# Patient Record
Sex: FEMALE | Race: WHITE
[De-identification: names, ages, dates, MRNs, and addresses within clinical notes are randomized per-mention and may not be internally consistent; named-entity substitution may affect disease eponyms.]

---

## 2018-06-04 ENCOUNTER — HOSPITAL ENCOUNTER (OUTPATIENT)
Age: 73
End: 2018-06-04
Payer: MEDICARE

## 2018-06-04 DIAGNOSIS — Z13.818: Primary | ICD-10-CM

## 2018-06-04 LAB — HCV AB SERPL QL IA: NEGATIVE S/C

## 2018-06-04 PROCEDURE — 36415 COLL VENOUS BLD VENIPUNCTURE: CPT

## 2018-06-04 PROCEDURE — 86803 HEPATITIS C AB TEST: CPT

## 2018-08-31 ENCOUNTER — HOSPITAL ENCOUNTER (OUTPATIENT)
Age: 73
End: 2018-08-31
Payer: MEDICARE

## 2018-08-31 DIAGNOSIS — I10: Primary | ICD-10-CM

## 2018-08-31 LAB
BUN SERPL-MCNC: 9 MG/DL (ref 7–17)
CALCIUM SERPL-MCNC: 9.2 MG/DL (ref 8.4–10.2)
CHLORIDE SERPL-SCNC: 96 MMOL/L (ref 98–107)
CO2 SERPL-SCNC: 31 MMOL/L (ref 22–32)
ESTIMATED GLOMERULAR FILT RATE: > 60 ML/MIN (ref 60–?)
GLUCOSE SERPL-MCNC: 94 MG/DL (ref 80–110)
HEMOLYSIS: < 15 (ref 0–50)
POTASSIUM SERPL-SCNC: 4.9 MMOL/L (ref 3.4–5.1)
SODIUM SERPL-SCNC: 133 MMOL/L (ref 137–145)

## 2018-08-31 PROCEDURE — 80048 BASIC METABOLIC PNL TOTAL CA: CPT

## 2018-08-31 PROCEDURE — 36415 COLL VENOUS BLD VENIPUNCTURE: CPT

## 2018-12-12 ENCOUNTER — HOSPITAL ENCOUNTER (OUTPATIENT)
Age: 73
End: 2018-12-12
Payer: MEDICARE

## 2018-12-12 DIAGNOSIS — Z80.3: ICD-10-CM

## 2018-12-12 DIAGNOSIS — Z12.31: Primary | ICD-10-CM

## 2018-12-12 PROCEDURE — 77063 BREAST TOMOSYNTHESIS BI: CPT

## 2018-12-12 PROCEDURE — 77067 SCR MAMMO BI INCL CAD: CPT

## 2018-12-12 NOTE — DI.MG.S_ITS
BILATERAL DIGITAL SCREENING MAMMOGRAM 3D/2D WITH CAD: 12/12/2018  
   
CLINICAL: Routine screening. Family history of breast cancer.    
   
Comparison is made to exams dated:  10/11/2017 mammogram, 6/8/2016 mammogram, 6/3/2015   
mammogram, 3/12/2010 mammogram, 5/30/2014 mammogram, and 5/1/2013 mammogram - [facility].  There are scattered fibroglandular elements in both breasts.    
   
Current study was also evaluated with a Computer Aided Detection (CAD) system.    
No significant masses, calcifications, or other findings are seen in either breast.    
There has been no significant interval change.  
   
IMPRESSION: NEGATIVE  
There is no mammographic evidence of malignancy. A 1 year screening mammogram is   
recommended.     
   
This exam was interpreted at Station ID: [id number].    
   
NOTE: For mammograms, a report in lay terms will be sent to the patient. Approximately   
15% of breast malignancies will not be visualized mammographically. In the management of   
a palpable breast mass, a negative mammogram must not discourage biopsy of a clinically   
suspicious lesion.  
   
Electronically Signed By: Flakito clemens/nilson:12/12/2018 14:12:06    
   
   
letter sent: Normal Exam    
ACR BI-RADS Category 1: Negative [id number]

## 2019-02-18 ENCOUNTER — HOSPITAL ENCOUNTER (OUTPATIENT)
Age: 74
End: 2019-02-18
Payer: MEDICARE

## 2019-02-18 DIAGNOSIS — Z90.722: ICD-10-CM

## 2019-02-18 DIAGNOSIS — Z78.0: ICD-10-CM

## 2019-02-18 DIAGNOSIS — M85.852: Primary | ICD-10-CM

## 2019-02-18 DIAGNOSIS — Z82.62: ICD-10-CM

## 2019-02-18 PROCEDURE — 77080 DXA BONE DENSITY AXIAL: CPT

## 2019-02-18 NOTE — DI.RAD.S_ITS
This blank DEXA report has been sent in error by the PACS system.  The correct and   
complete report will be forthcoming in 1-2 days.  Thank you for your patience and   
understanding.     
   
   
   
Dictated by: Gauri Armando M.D. on 2/18/2019 at 16:35       
Approved by: Gauri Armando M.D. on 2/18/2019 at 16:35

## 2019-06-19 ENCOUNTER — HOSPITAL ENCOUNTER (OUTPATIENT)
Age: 74
End: 2019-06-19
Payer: MEDICARE

## 2019-06-19 DIAGNOSIS — I10: Primary | ICD-10-CM

## 2019-06-19 LAB
CHOLEST SERPL-MCNC: 213 MG/DL (ref 140–199)
HDLC SERPL-MCNC: > 110 MG/DL (ref 40–60)
TRIGL SERPL-MCNC: 51 MG/DL (ref 35–150)

## 2019-06-19 PROCEDURE — 80061 LIPID PANEL: CPT

## 2019-06-19 PROCEDURE — 36415 COLL VENOUS BLD VENIPUNCTURE: CPT

## 2020-06-10 ENCOUNTER — HOSPITAL ENCOUNTER (OUTPATIENT)
Age: 75
End: 2020-06-10
Payer: MEDICARE

## 2020-06-10 DIAGNOSIS — E78.2: ICD-10-CM

## 2020-06-10 DIAGNOSIS — I10: Primary | ICD-10-CM

## 2020-06-10 LAB
BUN SERPL-MCNC: 20 MG/DL (ref 7–17)
CALCIUM SERPL-MCNC: 9.6 MG/DL (ref 8.4–10.2)
CHLORIDE SERPL-SCNC: 103 MMOL/L (ref 98–107)
CHOLEST SERPL-MCNC: 245 MG/DL (ref 140–199)
CO2 SERPL-SCNC: 24 MMOL/L (ref 22–32)
ESTIMATED GLOMERULAR FILT RATE: > 60 ML/MIN (ref 60–?)
GLUCOSE SERPL-MCNC: 108 MG/DL (ref 80–110)
HDLC SERPL-MCNC: 132 MG/DL (ref 40–60)
HEMOLYSIS: < 15 (ref 0–50)
POTASSIUM SERPL-SCNC: 4.6 MMOL/L (ref 3.4–5.1)
SODIUM SERPL-SCNC: 134 MMOL/L (ref 137–145)
TRIGL SERPL-MCNC: 46 MG/DL (ref 35–150)

## 2020-06-10 PROCEDURE — 80048 BASIC METABOLIC PNL TOTAL CA: CPT

## 2020-06-10 PROCEDURE — 80061 LIPID PANEL: CPT

## 2020-06-10 PROCEDURE — 36415 COLL VENOUS BLD VENIPUNCTURE: CPT

## 2020-09-24 ENCOUNTER — HOSPITAL ENCOUNTER (OUTPATIENT)
Age: 75
End: 2020-09-24
Payer: MEDICARE

## 2020-09-24 DIAGNOSIS — S22.029A: Primary | ICD-10-CM

## 2020-09-24 DIAGNOSIS — R07.81: ICD-10-CM

## 2020-09-24 DIAGNOSIS — M54.9: ICD-10-CM

## 2020-09-24 DIAGNOSIS — M79.601: ICD-10-CM
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
## 2020-09-24 PROCEDURE — 71101 X-RAY EXAM UNILAT RIBS/CHEST: CPT

## 2020-09-24 PROCEDURE — 72100 X-RAY EXAM L-S SPINE 2/3 VWS: CPT

## 2020-09-24 PROCEDURE — 72072 X-RAY EXAM THORAC SPINE 3VWS: CPT

## 2020-09-24 PROCEDURE — 73110 X-RAY EXAM OF WRIST: CPT

## 2020-09-24 NOTE — DI.RAD.S_ITS
PROCEDURE:  XR LUMBAR SPINE 2-3V  
   
INDICATIONS:  Fall  
   
TECHNIQUE:  Three views of the lumbar spine were acquired.    
   
COMPARISON:  None.  
   
FINDINGS:    
   
Bones:  Five non-rib-bearing vertebrae are present.  There is an L2 compression fracture   
without retropulsion of fracture fragments.  There is anterior endplate spur formation at   
this and other levels.  Trace anterolisthesis L4 on five and moderate disc height loss   
L5-S1.  No suspicious bony lesions.    
   
Soft tissues:  Overlying bowel gas pattern is normal.  No suspicious soft tissue   
calcifications.    
   
IMPRESSION:    
1. Findings of possible acute L2 compression fracture.  No prior studies are available   
for comparison.  Correlate clinically and consider MRI to assess for acuity.  
2. Moderate degenerative disc and endplate changes.    
   
   
Dictated by: Gauri Armando M.D. on 9/24/2020 at 18:09       
Approved by: Gauri Armando M.D. on 9/24/2020 at 18:10

## 2020-09-24 NOTE — DI.RAD.S_ITS
PROCEDURE:  XR RIBS LT MIN 3V W CXR1V  
   
INDICATIONS:  Fall  
   
TECHNIQUE:  Two views of the left ribs were acquired, along with a single view chest.    
   
COMPARISON:  None.  
   
FINDINGS:    
   
Surgical changes and devices:  None.    
   
Bones and chest wall:  No fractures or dislocations.  No suspicious bony lesions.    
Overlying soft tissues appear unremarkable.    
   
Lungs and pleura:  No pleural effusions or pneumothorax.  Lungs appear clear.    
   
Mediastinum:  Mediastinal contours appear normal.  Heart size is normal.    
   
IMPRESSION:    
1. No displaced rib fractures.  
2. No radiographic evidence of underlying chest trauma.    
   
   
Dictated by: Gauri Armando M.D. on 9/24/2020 at 18:08       
Approved by: Gauri Armando M.D. on 9/24/2020 at 18:09

## 2020-09-24 NOTE — DI.RAD.S_ITS
8PROCEDURE:  XR WRIST RT MIN 3V  
   
INDICATIONS: Fall  
   
TECHNIQUE:  Four views of the wrist were acquired.    
   
COMPARISON:  None.  
   
FINDINGS:    
   
Bones:  No fractures or dislocations.  No suspicious bony lesions.  Severe degenerative   
changes, particularly at the 2nd and 3rd MCP joints.  
   
Scaphoid view:  No scaphoid fracture.  
   
Soft tissues:  No suspicious soft tissue calcifications.    
   
IMPRESSION:    
1. No visible fracture.  If there is continued concern for occult fracture,   
immobilization and reimaging in 7-10 days is recommended.  
2. Degenerative changes at the 2nd and 3rd MCP joints particularly.    
   
   
Dictated by: Gauri Armnado M.D. on 9/24/2020 at 18:10       
Approved by: Gauri Armando M.D. on 9/24/2020 at 18:12

## 2020-10-23 ENCOUNTER — HOSPITAL ENCOUNTER (OUTPATIENT)
Age: 75
End: 2020-10-23
Payer: MEDICARE

## 2020-10-23 DIAGNOSIS — M25.531: Primary | ICD-10-CM

## 2020-10-23 DIAGNOSIS — S52.571A: ICD-10-CM

## 2020-10-23 PROCEDURE — 73200 CT UPPER EXTREMITY W/O DYE: CPT

## 2020-10-23 NOTE — DI.CT.S_ITS
PROCEDURE:  CT UE RT WO CON  
   
INDICATIONS:  Pain in right wrist  
   
TECHNIQUE:    
Noncontrast 1 mm axial sections acquired through the carpal bones, with coronal and   
sagittal reformats.   
   
   
COMPARISON:  [facility], CR, XR WRIST RT MIN 3V, 9/24/2020, 16:33.  
   
FINDINGS:    
Image quality:  Excellent.    
   
Bones:  There is a subtle slightly comminuted fracture involving radial aspect of distal   
radius extending to base of radial styloid and radiocarpal joint space.  Minimal anterior   
and lateral displacement is seen with up to 3.2 mm diastasis at the fracture site.  No   
other fracture or dislocation is seen.  Osteoarthritic changes are noted throughout wrist   
joints.  No suspicious intraosseous lesion.  
   
Soft tissues:  There is mild soft tissue swelling over radial aspect of right wrist.    
Overlying thickened extensor pollicis brevis and abductor pollicis longus tendon at the   
level of radial styloid is lead noted suggestive of tendinosis/low-grade   
partial-thickness tear.  No gross full-thickness wrist tendon rupture.  No abnormal soft   
tissue calcification is seen.  
   
IMPRESSION:    
1. Subacute appearing comminuted and minimally displaced distal radial intra-articular   
fracture extending to base of radial styloid as described above.  No other fracture or   
dislocation.  Wrist joint osteoarthritis.  
2. Soft tissue swelling around right distal radial fracture site with suggestion of   
tendinosis/low-grade partial-thickness tear involving adjacent extensor pollicis brevis   
and abductor pollicis longus tendons.  
   
   
Dictated by: Aaron Inman M.D. on 10/23/2020 at 13:20       
Approved by: Aaron Inman M.D. on 10/23/2020 at 13:28

## 2021-01-21 ENCOUNTER — HOSPITAL ENCOUNTER (OUTPATIENT)
Age: 76
End: 2021-01-21
Payer: MEDICARE

## 2021-01-21 DIAGNOSIS — Z12.31: Primary | ICD-10-CM

## 2021-01-21 PROCEDURE — 77063 BREAST TOMOSYNTHESIS BI: CPT

## 2021-01-21 PROCEDURE — 77067 SCR MAMMO BI INCL CAD: CPT

## 2021-01-21 NOTE — DI.MG.S_ITS
BILATERAL DIGITAL SCREENING MAMMOGRAM 3D/2D WITH CAD: 1/21/2021  
   
CLINICAL: Routine screening. Family history of breast cancer.    
   
Comparison is made to exams dated:  12/12/2018 mammogram, 10/11/2017 mammogram, and   
6/8/2016 mammogram - [facility].  There are scattered fibroglandular elements in   
both breasts.    
   
Current study was also evaluated with a Computer Aided Detection (CAD) system.    
No significant masses, calcifications, or other findings are seen in either breast.    
There has been no significant interval change.  
   
IMPRESSION: NEGATIVE  
There is no mammographic evidence of malignancy. A 1 year screening mammogram is   
recommended.    
   
   
This exam was interpreted at Station ID: [id number].    
   
NOTE: For mammograms, a report in lay terms will be sent to the patient. Approximately   
15% of breast malignancies will not be visualized mammographically. In the management of   
a palpable breast mass, a negative mammogram must not discourage biopsy of a clinically   
suspicious lesion.  
   
Electronically Signed By: Brarett syed/nilson:1/21/2021 11:36:52    
   
   
letter sent: Normal Exam    
ACR BI-RADS Category 1: Negative [id number]

## 2021-04-15 ENCOUNTER — HOSPITAL ENCOUNTER (OUTPATIENT)
Age: 76
End: 2021-04-15
Payer: MEDICARE

## 2021-04-15 DIAGNOSIS — R53.83: Primary | ICD-10-CM

## 2021-04-15 DIAGNOSIS — E78.2: ICD-10-CM

## 2021-04-15 DIAGNOSIS — I10: ICD-10-CM

## 2021-04-15 LAB
ADD MANUAL DIFF / SLIDE REVIEW: NO
ALBUMIN SERPL-MCNC: 4.3 G/DL (ref 3.5–5)
ALBUMIN/GLOB SERPL: 1.6 {RATIO} (ref 1–2.8)
ALP SERPL-CCNC: 68 U/L (ref 38–126)
ALT SERPL-CCNC: 27 IU/L (ref ?–35)
BUN SERPL-MCNC: 11 MG/DL (ref 7–17)
CALCIUM SERPL-MCNC: 9.6 MG/DL (ref 8.4–10.2)
CHLORIDE SERPL-SCNC: 102 MMOL/L (ref 98–107)
CHOLEST SERPL-MCNC: 240 MG/DL (ref 140–199)
CO2 SERPL-SCNC: 27 MMOL/L (ref 22–32)
ESTIMATED GLOMERULAR FILT RATE: > 60 ML/MIN (ref 60–?)
GLOBULIN SER CALC-MCNC: 2.7 G/DL (ref 1.7–4.1)
GLUCOSE SERPL-MCNC: 99 MG/DL (ref 80–110)
HDLC SERPL-MCNC: 134 MG/DL (ref 40–60)
HEMATOCRIT: 39.7 % (ref 36–46)
HEMOGLOBIN: 13.1 G/DL (ref 12–16)
HEMOLYSIS: < 15 (ref 0–50)
LYMPHOCYTES # SPEC AUTO: 2200 /UL (ref 1100–4500)
MCV RBC: 92.8 FL (ref 80–100)
MEAN CORPUSCULAR HEMOGLOBIN: 30.6 PG (ref 26–34)
MEAN CORPUSCULAR HGB CONC: 32.9 % (ref 30–36)
PLATELET COUNT: 322 X10^3/UL (ref 150–400)
POTASSIUM SERPL-SCNC: 4.2 MMOL/L (ref 3.4–5.1)
PROT SERPL-MCNC: 7 G/DL (ref 6.3–8.2)
SODIUM SERPL-SCNC: 135 MMOL/L (ref 137–145)
TRIGL SERPL-MCNC: 45 MG/DL (ref 35–150)
TSH W/ REFLEX TO FT4: 1.28 UIU/ML (ref 0.47–4.68)
VIT B12 SERPL-MCNC: > 1000 PG/ML (ref 239–931)

## 2021-04-15 PROCEDURE — 80053 COMPREHEN METABOLIC PANEL: CPT

## 2021-04-15 PROCEDURE — 85025 COMPLETE CBC W/AUTO DIFF WBC: CPT

## 2021-04-15 PROCEDURE — 82607 VITAMIN B-12: CPT

## 2021-04-15 PROCEDURE — 84443 ASSAY THYROID STIM HORMONE: CPT

## 2021-04-15 PROCEDURE — 36415 COLL VENOUS BLD VENIPUNCTURE: CPT

## 2021-04-15 PROCEDURE — 80061 LIPID PANEL: CPT

## 2021-06-28 ENCOUNTER — HOSPITAL ENCOUNTER (OUTPATIENT)
Age: 76
End: 2021-06-28
Payer: MEDICARE

## 2021-06-28 DIAGNOSIS — Z01.812: Primary | ICD-10-CM

## 2021-06-28 DIAGNOSIS — Z20.822: ICD-10-CM

## 2021-06-28 PROCEDURE — 87635 SARS-COV-2 COVID-19 AMP PRB: CPT

## 2021-06-29 ENCOUNTER — HOSPITAL ENCOUNTER (OUTPATIENT)
Age: 76
Discharge: HOME | End: 2021-06-29
Payer: MEDICARE

## 2021-06-29 VITALS
OXYGEN SATURATION: 94 % | RESPIRATION RATE: 16 BRPM | DIASTOLIC BLOOD PRESSURE: 74 MMHG | HEART RATE: 71 BPM | TEMPERATURE: 97.16 F | SYSTOLIC BLOOD PRESSURE: 146 MMHG

## 2021-06-29 VITALS
DIASTOLIC BLOOD PRESSURE: 75 MMHG | SYSTOLIC BLOOD PRESSURE: 139 MMHG | RESPIRATION RATE: 16 BRPM | HEART RATE: 68 BPM | OXYGEN SATURATION: 100 %

## 2021-06-29 VITALS — BODY MASS INDEX: 25.7 KG/M2

## 2021-06-29 DIAGNOSIS — H25.11: Primary | ICD-10-CM

## 2021-06-29 DIAGNOSIS — I10: ICD-10-CM

## 2021-06-29 PROCEDURE — V2787 ASTIGMATISM-CORRECT FUNCTION: HCPCS

## 2021-06-29 PROCEDURE — 66984 XCAPSL CTRC RMVL W/O ECP: CPT

## 2021-07-10 ENCOUNTER — HOSPITAL ENCOUNTER (OUTPATIENT)
Age: 76
End: 2021-07-10
Payer: MEDICARE

## 2021-07-10 DIAGNOSIS — Z01.812: Primary | ICD-10-CM

## 2021-07-10 DIAGNOSIS — Z20.822: ICD-10-CM

## 2021-07-10 PROCEDURE — 87635 SARS-COV-2 COVID-19 AMP PRB: CPT

## 2021-07-13 ENCOUNTER — HOSPITAL ENCOUNTER (OUTPATIENT)
Age: 76
Discharge: HOME | End: 2021-07-13
Payer: MEDICARE

## 2021-07-13 VITALS
TEMPERATURE: 97.52 F | DIASTOLIC BLOOD PRESSURE: 69 MMHG | RESPIRATION RATE: 22 BRPM | SYSTOLIC BLOOD PRESSURE: 166 MMHG | OXYGEN SATURATION: 98 % | HEART RATE: 65 BPM

## 2021-07-13 VITALS
RESPIRATION RATE: 18 BRPM | HEART RATE: 68 BPM | OXYGEN SATURATION: 100 % | SYSTOLIC BLOOD PRESSURE: 149 MMHG | TEMPERATURE: 97.88 F | DIASTOLIC BLOOD PRESSURE: 78 MMHG

## 2021-07-13 VITALS — BODY MASS INDEX: 24.9 KG/M2

## 2021-07-13 DIAGNOSIS — H25.12: Primary | ICD-10-CM

## 2021-07-13 DIAGNOSIS — I10: ICD-10-CM

## 2021-07-13 PROCEDURE — 66984 XCAPSL CTRC RMVL W/O ECP: CPT

## 2021-11-24 ENCOUNTER — HOSPITAL ENCOUNTER (EMERGENCY)
Age: 76
Discharge: HOME | End: 2021-11-24
Payer: MEDICARE

## 2021-11-24 VITALS
TEMPERATURE: 97.9 F | HEART RATE: 79 BPM | DIASTOLIC BLOOD PRESSURE: 79 MMHG | SYSTOLIC BLOOD PRESSURE: 168 MMHG | OXYGEN SATURATION: 100 % | RESPIRATION RATE: 16 BRPM

## 2021-11-24 VITALS — BODY MASS INDEX: 26.3 KG/M2

## 2021-11-24 DIAGNOSIS — N39.0: Primary | ICD-10-CM

## 2021-11-24 PROCEDURE — 99281 EMR DPT VST MAYX REQ PHY/QHP: CPT

## 2021-12-14 ENCOUNTER — HOSPITAL ENCOUNTER (OUTPATIENT)
Age: 76
End: 2021-12-14
Payer: MEDICARE

## 2021-12-14 DIAGNOSIS — N39.0: Primary | ICD-10-CM

## 2021-12-14 LAB
APPEARANCE UR: CLEAR
BILIRUBIN URINE UA: NEGATIVE
COLOR UR: YELLOW
CULTURE INDICATED URINE: (no result)
GLUCOSE URINE UA: NEGATIVE G/DL
HGB UR QL: (no result)
KETONES URINE UA: NEGATIVE
LEUKOCYTE ESTERASE URINE UA: (no result)
NITRITE URINE UA: NEGATIVE
PH UR: 7 [PH] (ref 4.5–8)
PROTEIN URINE UA: NEGATIVE
SP GR UR: 1.01 (ref 1–1.03)
UROBILINOGEN UR QL: 0.2 E.U./DL

## 2021-12-14 PROCEDURE — 87086 URINE CULTURE/COLONY COUNT: CPT

## 2021-12-14 PROCEDURE — 81001 URINALYSIS AUTO W/SCOPE: CPT

## 2022-02-09 ENCOUNTER — HOSPITAL ENCOUNTER (OUTPATIENT)
Age: 77
End: 2022-02-09
Payer: MEDICARE

## 2022-02-09 DIAGNOSIS — Z12.31: Primary | ICD-10-CM

## 2022-02-09 DIAGNOSIS — Z80.3: ICD-10-CM

## 2022-02-09 PROCEDURE — 77067 SCR MAMMO BI INCL CAD: CPT

## 2022-02-09 PROCEDURE — 77063 BREAST TOMOSYNTHESIS BI: CPT

## 2022-04-14 ENCOUNTER — HOSPITAL ENCOUNTER (OUTPATIENT)
Dept: HOSPITAL 73 - PHYS | Age: 77
LOS: 231 days | Discharge: HOME | End: 2022-12-01
Payer: MEDICARE

## 2022-04-14 DIAGNOSIS — M17.11: Primary | ICD-10-CM

## 2022-04-14 PROCEDURE — 97113 AQUATIC THERAPY/EXERCISES: CPT

## 2022-04-14 PROCEDURE — 97110 THERAPEUTIC EXERCISES: CPT

## 2022-04-14 PROCEDURE — 97116 GAIT TRAINING THERAPY: CPT

## 2022-04-14 PROCEDURE — 97535 SELF CARE MNGMENT TRAINING: CPT

## 2022-04-14 PROCEDURE — 97014 ELECTRIC STIMULATION THERAPY: CPT

## 2022-04-14 PROCEDURE — 97140 MANUAL THERAPY 1/> REGIONS: CPT

## 2022-04-14 PROCEDURE — 97112 NEUROMUSCULAR REEDUCATION: CPT

## 2022-04-14 PROCEDURE — 97032 APPL MODALITY 1+ESTIM EA 15: CPT

## 2022-04-14 PROCEDURE — 97162 PT EVAL MOD COMPLEX 30 MIN: CPT

## 2022-04-14 PROCEDURE — G0283 ELEC STIM OTHER THAN WOUND: HCPCS

## 2022-04-14 PROCEDURE — 97010 HOT OR COLD PACKS THERAPY: CPT

## 2022-08-12 ENCOUNTER — HOSPITAL ENCOUNTER (OUTPATIENT)
Age: 77
End: 2022-08-12
Payer: MEDICARE

## 2022-08-12 DIAGNOSIS — E78.5: ICD-10-CM

## 2022-08-12 DIAGNOSIS — R07.89: Primary | ICD-10-CM

## 2022-08-12 DIAGNOSIS — I10: ICD-10-CM

## 2022-08-12 DIAGNOSIS — Z20.822: ICD-10-CM

## 2022-08-12 PROCEDURE — A9502 TC99M TETROFOSMIN: HCPCS

## 2022-08-12 PROCEDURE — 93017 CV STRESS TEST TRACING ONLY: CPT

## 2022-08-12 PROCEDURE — 78452 HT MUSCLE IMAGE SPECT MULT: CPT

## 2022-08-12 PROCEDURE — 87635 SARS-COV-2 COVID-19 AMP PRB: CPT

## 2023-02-24 ENCOUNTER — HOSPITAL ENCOUNTER (OUTPATIENT)
Age: 78
End: 2023-02-24
Payer: MEDICARE

## 2023-02-24 DIAGNOSIS — Z12.31: Primary | ICD-10-CM

## 2023-02-24 DIAGNOSIS — Z80.3: ICD-10-CM

## 2023-02-24 PROCEDURE — 77063 BREAST TOMOSYNTHESIS BI: CPT

## 2023-02-24 PROCEDURE — 77067 SCR MAMMO BI INCL CAD: CPT

## 2023-04-27 ENCOUNTER — HOSPITAL ENCOUNTER (OUTPATIENT)
Age: 78
End: 2023-04-27
Payer: MEDICARE

## 2023-04-27 DIAGNOSIS — R52: Primary | ICD-10-CM

## 2023-04-27 LAB
COVID-19 CEPHEID 4-PLEX PCR: POSITIVE
FLUAV RNA UPPER RESP QL NAA+PROBE: (no result)
FLUBV RNA UPPER RESP QL NAA+PROBE: (no result)

## 2023-04-27 PROCEDURE — 0241U: CPT

## 2023-05-30 ENCOUNTER — HOSPITAL ENCOUNTER (OUTPATIENT)
Age: 78
End: 2023-05-30
Payer: MEDICARE

## 2023-05-30 DIAGNOSIS — Z87.891: ICD-10-CM

## 2023-05-30 DIAGNOSIS — R31.21: Primary | ICD-10-CM

## 2023-05-30 LAB
BUN SERPL-MCNC: 11 MG/DL (ref 7–17)
CALCIUM SERPL-MCNC: 9.2 MG/DL (ref 8.4–10.2)
CHLORIDE SERPL-SCNC: 100 MMOL/L (ref 98–107)
CO2 SERPL-SCNC: 26 MMOL/L (ref 22–32)
ESTIMATED GLOMERULAR FILT RATE: > 60 ML/MIN (ref 60–?)
GLUCOSE SERPL-MCNC: 83 MG/DL (ref 80–110)
HEMOLYSIS: < 15 (ref 0–50)
POTASSIUM SERPL-SCNC: 4.6 MMOL/L (ref 3.4–5.1)
SODIUM SERPL-SCNC: 133 MMOL/L (ref 137–145)

## 2023-05-30 PROCEDURE — 36415 COLL VENOUS BLD VENIPUNCTURE: CPT

## 2023-05-30 PROCEDURE — 80048 BASIC METABOLIC PNL TOTAL CA: CPT

## 2023-05-31 ENCOUNTER — HOSPITAL ENCOUNTER (OUTPATIENT)
Age: 78
End: 2023-05-31
Payer: MEDICARE

## 2023-05-31 DIAGNOSIS — R31.21: Primary | ICD-10-CM

## 2023-05-31 DIAGNOSIS — Z87.891: ICD-10-CM

## 2023-05-31 PROCEDURE — 74178 CT ABD&PLV WO CNTR FLWD CNTR: CPT

## 2023-08-14 ENCOUNTER — HOSPITAL ENCOUNTER (OUTPATIENT)
Age: 78
End: 2023-08-14
Payer: MEDICARE

## 2023-08-14 DIAGNOSIS — R93.429: Primary | ICD-10-CM

## 2023-08-14 DIAGNOSIS — M43.9: ICD-10-CM

## 2023-08-14 DIAGNOSIS — R31.21: ICD-10-CM

## 2023-08-14 PROCEDURE — 74176 CT ABD & PELVIS W/O CONTRAST: CPT

## 2023-09-11 ENCOUNTER — HOSPITAL ENCOUNTER (OUTPATIENT)
Age: 78
Discharge: HOME | End: 2023-09-11
Payer: MEDICARE

## 2023-09-11 VITALS
HEART RATE: 72 BPM | RESPIRATION RATE: 13 BRPM | SYSTOLIC BLOOD PRESSURE: 121 MMHG | OXYGEN SATURATION: 95 % | DIASTOLIC BLOOD PRESSURE: 66 MMHG

## 2023-09-11 VITALS
RESPIRATION RATE: 16 BRPM | DIASTOLIC BLOOD PRESSURE: 71 MMHG | TEMPERATURE: 96.98 F | OXYGEN SATURATION: 98 % | HEART RATE: 62 BPM | SYSTOLIC BLOOD PRESSURE: 141 MMHG

## 2023-09-11 VITALS
TEMPERATURE: 98.06 F | SYSTOLIC BLOOD PRESSURE: 148 MMHG | HEART RATE: 97 BPM | DIASTOLIC BLOOD PRESSURE: 78 MMHG | OXYGEN SATURATION: 96 % | RESPIRATION RATE: 16 BRPM

## 2023-09-11 VITALS
TEMPERATURE: 96.98 F | RESPIRATION RATE: 20 BRPM | SYSTOLIC BLOOD PRESSURE: 114 MMHG | OXYGEN SATURATION: 94 % | HEART RATE: 67 BPM | DIASTOLIC BLOOD PRESSURE: 65 MMHG

## 2023-09-11 VITALS
OXYGEN SATURATION: 94 % | RESPIRATION RATE: 11 BRPM | HEART RATE: 75 BPM | DIASTOLIC BLOOD PRESSURE: 67 MMHG | TEMPERATURE: 96.98 F | SYSTOLIC BLOOD PRESSURE: 121 MMHG

## 2023-09-11 VITALS — BODY MASS INDEX: 26.5 KG/M2

## 2023-09-11 VITALS
SYSTOLIC BLOOD PRESSURE: 118 MMHG | RESPIRATION RATE: 10 BRPM | HEART RATE: 71 BPM | OXYGEN SATURATION: 96 % | DIASTOLIC BLOOD PRESSURE: 65 MMHG

## 2023-09-11 DIAGNOSIS — R31.21: Primary | ICD-10-CM

## 2023-09-11 DIAGNOSIS — R93.429: ICD-10-CM

## 2023-09-11 DIAGNOSIS — Z87.891: ICD-10-CM

## 2023-09-11 PROCEDURE — 74018 RADEX ABDOMEN 1 VIEW: CPT

## 2023-09-11 PROCEDURE — 52351 CYSTOURETERO & OR PYELOSCOPE: CPT

## 2023-09-11 PROCEDURE — 76000 FLUOROSCOPY <1 HR PHYS/QHP: CPT

## 2024-03-08 ENCOUNTER — HOSPITAL ENCOUNTER (OUTPATIENT)
Dept: HOSPITAL 73 - MAMMO | Age: 79
End: 2024-03-08
Payer: MEDICARE

## 2024-03-08 DIAGNOSIS — Z80.3: ICD-10-CM

## 2024-03-08 DIAGNOSIS — R92.323: ICD-10-CM

## 2024-03-08 DIAGNOSIS — Z12.31: Primary | ICD-10-CM

## 2024-03-08 PROCEDURE — 77063 BREAST TOMOSYNTHESIS BI: CPT

## 2024-03-08 PROCEDURE — 77067 SCR MAMMO BI INCL CAD: CPT

## 2024-08-23 ENCOUNTER — HOSPITAL ENCOUNTER (OUTPATIENT)
Age: 79
End: 2024-08-23
Payer: MEDICARE

## 2024-08-23 DIAGNOSIS — M85.852: Primary | ICD-10-CM

## 2024-08-23 DIAGNOSIS — Z78.0: ICD-10-CM

## 2024-08-23 PROCEDURE — 77080 DXA BONE DENSITY AXIAL: CPT

## 2024-08-23 NOTE — DI.RAD.S_ITS
PROCEDURE:  XR DEXA AXIAL SKELETON 
  
INDICATIONS:    POSTMENOPAUSAL STATUS 
  
COMPARISON:  [facility], CR, XR DEXA AXIAL SKELETON, 2/18/2019, 13:25. 
  
FINDINGS:    
  
Lumbar Spine:  Bone mineral density 1.172 g/cm2, T score 0.8, compared to -0.1.  
  
Left Hip:  Bone mineral density 0.780 g/cm2, T score -1.3, unchanged.. 
  
Left Femoral Neck:  Bone mineral density 0.672 g/cm2, T score -1.6, compared to -1.3. 
  
Right Hip:  Bone mineral density 0.764 g/cm2, T score -1.5, compared to -1.0. 
  
Right Femoral Neck:  Bone mineral density 0.685 g/cm2, T score -1.5, compared to -1.0. 
  
  
Fracture Risk Calculation (when applicable):   
10-year fracture risk of a major osteoporotic fracture 31% and of a hip fracture 17%,  
compared to 17.7% and 8.4% respectively. 
  
(T score greater or equal to -1.0 to: NORMAL) 
(T score from -1.1 to -2.4: OSTEOPENIA) 
(T score less than or equal to -2.5: OSTEOPOROSIS) 
  
IMPRESSION:   
  
Mild-to-moderate osteopenia most prominent in the left femoral neck overall demonstrating  
mild interval decreased bone mineral density. 
  
Follow-up guidelines as follows: 
Osteoporosis: Consider a repeat DEXA and Vertebral Fracture Assessment (VFA) exam in 2  
years or sooner if medically necessary, to reassess this patient's status. 
Osteopenia: Consider a repeat DEXA in 2-3 years to reassess this patient's status, or if  
there is a new clinical indication. 
Normal: Consider a repeat DEXA in 5 years or sooner, or if there is a new clinical  
indication. 
  
All treatment decisions require clinical judgment and consideration of individual patient  
factors, including patient preferences, comorbidities, previous drug use, risk factors  
not captured in the FRAX model (e.g., frailty, falls, vitamin D deficiency, increased  
bone turnover, interval significant decline in bone density ) and possible under- or  
over-estimation of fracture risk by FRAX. 
  
In addition, the NOF Guide recommends that FDA-approved medical therapies be considered  
in postmenopausal women and men age >= 50 years with a: 
* Hip or vertebral (clinical or morphometric) fracture 
* T-score of <=-2.5 at the spine or hip 
* Ten-year fracture probability by FRAX of >= 3% for hip fracture or >=20% for major  
osteoporotic fracture.  
  
People with diagnosed cases of osteoporosis or at high risk for fracture should have  
regular bone mineral density tests.  For patients eligible for Medicare, routine testing  
is allowed once every 2 years.  The testing frequency can be increased to one year for  
patients who have rapidly progressing disease, those who are receiving or discontinuing  
medical therapy to restore bone mass, or have additional risk factors.  
  
  
Dictated by: Arielle Ro M.D. on 8/23/2024 at 21:40      
Approved by: Arielle Ro M.D. on 8/23/2024 at 21:43

## 2025-04-03 ENCOUNTER — HOSPITAL ENCOUNTER (OUTPATIENT)
Age: 80
End: 2025-04-03
Payer: MEDICARE

## 2025-04-03 DIAGNOSIS — I10: ICD-10-CM

## 2025-04-03 DIAGNOSIS — E78.5: ICD-10-CM

## 2025-04-03 DIAGNOSIS — Z80.3: ICD-10-CM

## 2025-04-03 DIAGNOSIS — Z12.31: Primary | ICD-10-CM

## 2025-04-03 DIAGNOSIS — E66.3: ICD-10-CM

## 2025-04-03 LAB
ALBUMIN SERPL-MCNC: 4.5 G/DL (ref 3.5–5)
ALP SERPL-CCNC: 79 U/L (ref 38–126)
ALT SERPL-CCNC: 53 IU/L (ref ?–35)
BUN SERPL-MCNC: 14 MG/DL (ref 7–17)
CALCIUM SERPL-MCNC: 10.1 MG/DL (ref 8.4–10.2)
CHLORIDE SERPL-SCNC: 99 MMOL/L (ref 98–107)
CHOLEST SERPL-MCNC: 199 MG/DL (ref 140–199)
CO2 SERPL-SCNC: 25 MMOL/L (ref 22–32)
ESTIMATED GLOMERULAR FILT RATE: > 60 ML/MIN (ref 60–?)
GLOBULIN SER CALC-MCNC: 2.3 G/DL (ref 1.7–4.1)
GLUCOSE SERPL-MCNC: 98 MG/DL (ref 80–110)
HDLC SERPL-MCNC: 136 MG/DL (ref 40–60)
HEMOLYSIS: < 15 (ref 0–50)
POTASSIUM SERPL-SCNC: 5.5 MMOL/L (ref 3.4–5.1)
PROT SERPL-MCNC: 6.8 G/DL (ref 6.3–8.2)
SODIUM SERPL-SCNC: 131 MMOL/L (ref 137–145)
T4 FREE SERPL-MCNC: 0.97 NG/DL (ref 0.78–2.19)
TRIGL SERPL-MCNC: 40 MG/DL (ref 35–150)

## 2025-04-03 PROCEDURE — 80053 COMPREHEN METABOLIC PANEL: CPT

## 2025-04-03 PROCEDURE — 84439 ASSAY OF FREE THYROXINE: CPT

## 2025-04-03 PROCEDURE — 36415 COLL VENOUS BLD VENIPUNCTURE: CPT

## 2025-04-03 PROCEDURE — 80061 LIPID PANEL: CPT

## 2025-04-03 PROCEDURE — 77067 SCR MAMMO BI INCL CAD: CPT

## 2025-04-03 PROCEDURE — 77063 BREAST TOMOSYNTHESIS BI: CPT

## 2025-05-22 ENCOUNTER — HOSPITAL ENCOUNTER (OUTPATIENT)
Age: 80
End: 2025-05-22
Payer: MEDICARE
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
## 2025-05-22 DIAGNOSIS — S60.222A: ICD-10-CM

## 2025-05-22 DIAGNOSIS — S60.212A: Primary | ICD-10-CM

## 2025-05-22 PROCEDURE — 73110 X-RAY EXAM OF WRIST: CPT

## 2025-05-22 PROCEDURE — 73090 X-RAY EXAM OF FOREARM: CPT

## 2025-05-22 PROCEDURE — 73130 X-RAY EXAM OF HAND: CPT
